# Patient Record
Sex: FEMALE | Race: OTHER | HISPANIC OR LATINO
[De-identification: names, ages, dates, MRNs, and addresses within clinical notes are randomized per-mention and may not be internally consistent; named-entity substitution may affect disease eponyms.]

---

## 2018-06-04 ENCOUNTER — APPOINTMENT (OUTPATIENT)
Dept: GYNECOLOGIC ONCOLOGY | Facility: CLINIC | Age: 38
End: 2018-06-04
Payer: MEDICAID

## 2018-06-04 VITALS
WEIGHT: 196 LBS | HEART RATE: 61 BPM | DIASTOLIC BLOOD PRESSURE: 81 MMHG | OXYGEN SATURATION: 100 % | SYSTOLIC BLOOD PRESSURE: 119 MMHG

## 2018-06-04 PROCEDURE — 99203 OFFICE O/P NEW LOW 30 MIN: CPT | Mod: 25

## 2018-06-04 PROCEDURE — 57454 BX/CURETT OF CERVIX W/SCOPE: CPT

## 2018-06-04 RX ORDER — IRON/IRON ASP GLY/FA/MV-MIN 38 125-25-1MG
TABLET ORAL
Refills: 0 | Status: ACTIVE | COMMUNITY

## 2018-06-08 ENCOUNTER — RESULT REVIEW (OUTPATIENT)
Age: 38
End: 2018-06-08

## 2018-06-08 LAB — LH SURGICAL PATHOLOGY FINAL REPORT: NORMAL

## 2018-07-09 ENCOUNTER — APPOINTMENT (OUTPATIENT)
Dept: GYNECOLOGIC ONCOLOGY | Facility: CLINIC | Age: 38
End: 2018-07-09
Payer: MEDICAID

## 2018-07-09 VITALS
HEART RATE: 88 BPM | WEIGHT: 195.38 LBS | OXYGEN SATURATION: 97 % | DIASTOLIC BLOOD PRESSURE: 82 MMHG | SYSTOLIC BLOOD PRESSURE: 115 MMHG

## 2018-07-09 PROCEDURE — 36415 COLL VENOUS BLD VENIPUNCTURE: CPT

## 2018-07-09 PROCEDURE — 99214 OFFICE O/P EST MOD 30 MIN: CPT

## 2018-07-10 LAB
APTT BLD: 28 SEC
BASOPHILS # BLD AUTO: 0.05 K/UL
BASOPHILS NFR BLD AUTO: 0.8 %
EOSINOPHIL # BLD AUTO: 0.1 K/UL
EOSINOPHIL NFR BLD AUTO: 1.6 %
ESTIMATED AVERAGE GLUCOSE: 128 MG/DL
HBA1C MFR BLD HPLC: 6.1 %
HCT VFR BLD CALC: 35.4 %
HGB BLD-MCNC: 10.7 G/DL
IMM GRANULOCYTES NFR BLD AUTO: 0.2 %
INR PPP: 1.08 RATIO
LYMPHOCYTES # BLD AUTO: 2.6 K/UL
LYMPHOCYTES NFR BLD AUTO: 40.4 %
MAN DIFF?: NORMAL
MCHC RBC-ENTMCNC: 23.4 PG
MCHC RBC-ENTMCNC: 30.2 GM/DL
MCV RBC AUTO: 77.3 FL
MONOCYTES # BLD AUTO: 0.44 K/UL
MONOCYTES NFR BLD AUTO: 6.8 %
NEUTROPHILS # BLD AUTO: 3.23 K/UL
NEUTROPHILS NFR BLD AUTO: 50.2 %
PLATELET # BLD AUTO: 415 K/UL
PT BLD: 12.2 SEC
RBC # BLD: 4.58 M/UL
RBC # FLD: 14.9 %
WBC # FLD AUTO: 6.43 K/UL

## 2018-07-11 LAB
ALBUMIN SERPL ELPH-MCNC: 4.5 G/DL
ALP BLD-CCNC: 70 U/L
ALT SERPL-CCNC: 14 U/L
ANION GAP SERPL CALC-SCNC: 12 MMOL/L
AST SERPL-CCNC: 21 U/L
BILIRUB SERPL-MCNC: 0.3 MG/DL
BUN SERPL-MCNC: 10 MG/DL
CALCIUM SERPL-MCNC: 9.7 MG/DL
CHLORIDE SERPL-SCNC: 103 MMOL/L
CO2 SERPL-SCNC: 24 MMOL/L
CREAT SERPL-MCNC: 0.89 MG/DL
POTASSIUM SERPL-SCNC: 4.3 MMOL/L
PROT SERPL-MCNC: 7.7 G/DL
SODIUM SERPL-SCNC: 139 MMOL/L

## 2018-07-19 ENCOUNTER — RESULT REVIEW (OUTPATIENT)
Age: 38
End: 2018-07-19

## 2018-07-19 ENCOUNTER — OUTPATIENT (OUTPATIENT)
Dept: OUTPATIENT SERVICES | Facility: HOSPITAL | Age: 38
LOS: 1 days | Discharge: HOME | End: 2018-07-19
Payer: COMMERCIAL

## 2018-07-19 ENCOUNTER — APPOINTMENT (OUTPATIENT)
Dept: GYNECOLOGIC ONCOLOGY | Facility: HOSPITAL | Age: 38
End: 2018-07-19

## 2018-07-19 VITALS
HEIGHT: 66 IN | TEMPERATURE: 97 F | OXYGEN SATURATION: 100 % | SYSTOLIC BLOOD PRESSURE: 110 MMHG | WEIGHT: 193.12 LBS | DIASTOLIC BLOOD PRESSURE: 78 MMHG | HEART RATE: 88 BPM | RESPIRATION RATE: 16 BRPM

## 2018-07-19 PROCEDURE — 58552 LAPARO-VAG HYST INCL T/O: CPT

## 2018-07-19 RX ORDER — ACETAMINOPHEN 500 MG
1000 TABLET ORAL ONCE
Qty: 0 | Refills: 0 | Status: COMPLETED | OUTPATIENT
Start: 2018-07-19 | End: 2018-07-19

## 2018-07-19 RX ORDER — OXYCODONE HYDROCHLORIDE 5 MG/1
5 TABLET ORAL EVERY 4 HOURS
Qty: 0 | Refills: 0 | Status: DISCONTINUED | OUTPATIENT
Start: 2018-07-19 | End: 2018-07-20

## 2018-07-19 RX ORDER — HEPARIN SODIUM 5000 [USP'U]/ML
5000 INJECTION INTRAVENOUS; SUBCUTANEOUS ONCE
Qty: 0 | Refills: 0 | Status: COMPLETED | OUTPATIENT
Start: 2018-07-19 | End: 2018-07-19

## 2018-07-19 RX ORDER — CELECOXIB 200 MG/1
400 CAPSULE ORAL ONCE
Qty: 0 | Refills: 0 | Status: COMPLETED | OUTPATIENT
Start: 2018-07-19 | End: 2018-07-19

## 2018-07-19 RX ORDER — HYDROMORPHONE HYDROCHLORIDE 2 MG/ML
0.2 INJECTION INTRAMUSCULAR; INTRAVENOUS; SUBCUTANEOUS
Qty: 0 | Refills: 0 | Status: DISCONTINUED | OUTPATIENT
Start: 2018-07-19 | End: 2018-07-20

## 2018-07-19 RX ORDER — SODIUM CHLORIDE 9 MG/ML
3 INJECTION INTRAMUSCULAR; INTRAVENOUS; SUBCUTANEOUS EVERY 8 HOURS
Qty: 0 | Refills: 0 | Status: DISCONTINUED | OUTPATIENT
Start: 2018-07-19 | End: 2018-07-20

## 2018-07-19 RX ORDER — KETOROLAC TROMETHAMINE 30 MG/ML
30 SYRINGE (ML) INJECTION EVERY 6 HOURS
Qty: 0 | Refills: 0 | Status: DISCONTINUED | OUTPATIENT
Start: 2018-07-19 | End: 2018-07-20

## 2018-07-19 RX ORDER — OXYCODONE HYDROCHLORIDE 5 MG/1
10 TABLET ORAL EVERY 4 HOURS
Qty: 0 | Refills: 0 | Status: DISCONTINUED | OUTPATIENT
Start: 2018-07-19 | End: 2018-07-20

## 2018-07-19 RX ORDER — GABAPENTIN 400 MG/1
600 CAPSULE ORAL ONCE
Qty: 0 | Refills: 0 | Status: COMPLETED | OUTPATIENT
Start: 2018-07-19 | End: 2018-07-19

## 2018-07-19 RX ORDER — ONDANSETRON 8 MG/1
4 TABLET, FILM COATED ORAL ONCE
Qty: 0 | Refills: 0 | Status: DISCONTINUED | OUTPATIENT
Start: 2018-07-19 | End: 2018-07-20

## 2018-07-19 RX ADMIN — Medication 30 MILLIGRAM(S): at 22:56

## 2018-07-19 RX ADMIN — GABAPENTIN 600 MILLIGRAM(S): 400 CAPSULE ORAL at 14:33

## 2018-07-19 RX ADMIN — Medication 400 MILLIGRAM(S): at 20:48

## 2018-07-19 RX ADMIN — CELECOXIB 400 MILLIGRAM(S): 200 CAPSULE ORAL at 14:30

## 2018-07-19 RX ADMIN — SODIUM CHLORIDE 3 MILLILITER(S): 9 INJECTION INTRAMUSCULAR; INTRAVENOUS; SUBCUTANEOUS at 22:07

## 2018-07-19 RX ADMIN — HEPARIN SODIUM 5000 UNIT(S): 5000 INJECTION INTRAVENOUS; SUBCUTANEOUS at 14:31

## 2018-07-19 RX ADMIN — Medication 30 MILLIGRAM(S): at 23:55

## 2018-07-19 RX ADMIN — Medication 1000 MILLIGRAM(S): at 14:31

## 2018-07-19 RX ADMIN — Medication 1000 MILLIGRAM(S): at 21:20

## 2018-07-19 NOTE — BRIEF OPERATIVE NOTE - PROCEDURE
<<-----Click on this checkbox to enter Procedure Robot-assisted hysterectomy with salpingectomy  07/19/2018  partial salpingectomy  Active  ALITTLERIC

## 2018-07-19 NOTE — BRIEF OPERATIVE NOTE - POST-OP DX
Cervical dysplasia  07/19/2018    Active  Samantha De La O  Multiparity  07/19/2018    Active  Samantha De La O

## 2018-07-19 NOTE — PROGRESS NOTE ADULT - SUBJECTIVE AND OBJECTIVE BOX
GYN POC   Patient seen at bedside at 1040pm.   Pain controlled with IV tylenol, at this time requesting toradol as pain recently increased to about 7/10. Tolerating sips and crackers.  No OOB yet.  S/p arauz, attempting to walk with nurses now to attempt to use bathroom.   Denies CP, palpitations, SOB, fever, chills, nausea, vomiting.    Vital Signs Last 24 Hrs  T(C): 36.9 (19 Jul 2018 21:48), Max: 36.9 (19 Jul 2018 19:40)  T(F): 98.4 (19 Jul 2018 21:48), Max: 98.5 (19 Jul 2018 19:40)  HR: 108 (19 Jul 2018 21:48) (88 - 108)  BP: 116/75 (19 Jul 2018 21:48) (110/78 - 128/72)  BP(mean): 90 (19 Jul 2018 21:48) (89 - 98)  RR: 14 (19 Jul 2018 21:48) (5 - 16)  SpO2: 100% (19 Jul 2018 21:48) (92% - 100%)    Physical Exam:  Gen: No Acute Distress  Pulm: Clear to auscultation bilaterally  GI: soft, appropriately tender, mildly distended, decreased BS, no rebound, no guarding, laproscopic sites are dry/clean/intact  Ext: [facility] in place, [facility]    I&O's Summary    19 Jul 2018 07:01  -  19 Jul 2018 22:48  --------------------------------------------------------  IN: 350 mL / OUT: 0 mL / NET: 350 mL      MEDICATIONS  (STANDING):  ketorolac   Injectable 30 milliGRAM(s) IV Push every 6 hours  sodium chloride 0.9% lock flush 3 milliLiter(s) IV Push every 8 hours    MEDICATIONS  (PRN):  HYDROmorphone  Injectable 0.2 milliGRAM(s) IV Push every 2 hours PRN breakthrough pain  oxyCODONE    IR 5 milliGRAM(s) Oral every 4 hours PRN Moderate Pain (4 - 6)  oxyCODONE    IR 10 milliGRAM(s) Oral every 4 hours PRN Severe Pain (7 - 10)    Allergies    No Known Drug Allergies  shellfish (Flushing (Skin))    Intolerances        LABS: GYN POC   Patient seen at bedside at 1040pm.   Pain was controlled with IV tylenol, but reports that recently her pain has increased to about 7/10 and now requesting toradol. States she has a dull ache in her abdomen that was initially relieved with the tylenol. Has not had any other medications since procedure.  Tolerating sips and crackers.  No OOB yet.  S/p arauz, attempting to walk with nurses now to attempt to use bathroom.   Denies CP, palpitations, SOB, fever, chills, nausea, vomiting.    Vital Signs Last 24 Hrs  T(C): 36.9 (19 Jul 2018 21:48), Max: 36.9 (19 Jul 2018 19:40)  T(F): 98.4 (19 Jul 2018 21:48), Max: 98.5 (19 Jul 2018 19:40)  HR: 108 (19 Jul 2018 21:48) (88 - 108)  BP: 116/75 (19 Jul 2018 21:48) (110/78 - 128/72)  BP(mean): 90 (19 Jul 2018 21:48) (89 - 98)  RR: 14 (19 Jul 2018 21:48) (5 - 16)  SpO2: 100% (19 Jul 2018 21:48) (92% - 100%)    Physical Exam:  Gen: No Acute Distress  Pulm: Clear to auscultation bilaterally  GI: soft, appropriately tender, non distended, +BS, no rebound, no guarding, laparoscopic sites are dry/clean/intact  Ext: SCDs in place, wnl    I&O's Summary    19 Jul 2018 07:01  -  19 Jul 2018 22:48  --------------------------------------------------------  IN: 350 mL / OUT: 0 mL / NET: 350 mL      MEDICATIONS  (STANDING):  ketorolac   Injectable 30 milliGRAM(s) IV Push every 6 hours  sodium chloride 0.9% lock flush 3 milliLiter(s) IV Push every 8 hours    MEDICATIONS  (PRN):  HYDROmorphone  Injectable 0.2 milliGRAM(s) IV Push every 2 hours PRN breakthrough pain  oxyCODONE    IR 5 milliGRAM(s) Oral every 4 hours PRN Moderate Pain (4 - 6)  oxyCODONE    IR 10 milliGRAM(s) Oral every 4 hours PRN Severe Pain (7 - 10)    Allergies    No Known Drug Allergies  shellfish (Flushing (Skin))    Intolerances        LABS:

## 2018-07-19 NOTE — PROGRESS NOTE ADULT - ASSESSMENT
37y Female POD# 0 s/p  RA total hysterectomy with  partial bilateral salpingectomy                                     1. Neuro/Pain:  Acetaminophen 1000mg IV q8 ATC, Toradol 30mg IV q8 ATC, Oxycodone 5mg or 10mg PRN, Hydromoprhone 0.2mg IVP for breakthrough  2  CV:   VS per routine  3. Pulm: Encourage ISS  4. GI: Low Residue Diet   5. :  s/p arauz, f/u TOV   6. Heme: -  7. ID: --  8. DVT ppx: SCDs, ambulation encouraged   9. Dispo: pending ability to urinate, d/c instructions discussed and follow up instructions discussed

## 2018-07-20 VITALS
RESPIRATION RATE: 18 BRPM | OXYGEN SATURATION: 97 % | HEART RATE: 106 BPM | SYSTOLIC BLOOD PRESSURE: 106 MMHG | DIASTOLIC BLOOD PRESSURE: 68 MMHG

## 2018-07-20 PROCEDURE — 86900 BLOOD TYPING SEROLOGIC ABO: CPT

## 2018-07-20 PROCEDURE — 88341 IMHCHEM/IMCYTCHM EA ADD ANTB: CPT

## 2018-07-20 PROCEDURE — 88360 TUMOR IMMUNOHISTOCHEM/MANUAL: CPT

## 2018-07-20 PROCEDURE — 86850 RBC ANTIBODY SCREEN: CPT

## 2018-07-20 PROCEDURE — 88305 TISSUE EXAM BY PATHOLOGIST: CPT

## 2018-07-20 PROCEDURE — 58552 LAPARO-VAG HYST INCL T/O: CPT

## 2018-07-20 PROCEDURE — 86901 BLOOD TYPING SEROLOGIC RH(D): CPT

## 2018-07-20 PROCEDURE — S2900: CPT

## 2018-07-25 LAB — SURGICAL PATHOLOGY STUDY: SIGNIFICANT CHANGE UP

## 2018-07-26 ENCOUNTER — APPOINTMENT (OUTPATIENT)
Dept: GYNECOLOGIC ONCOLOGY | Facility: CLINIC | Age: 38
End: 2018-07-26
Payer: MEDICAID

## 2018-07-26 VITALS
SYSTOLIC BLOOD PRESSURE: 102 MMHG | WEIGHT: 193.5 LBS | HEART RATE: 101 BPM | DIASTOLIC BLOOD PRESSURE: 70 MMHG | OXYGEN SATURATION: 100 %

## 2018-07-26 DIAGNOSIS — D25.1 INTRAMURAL LEIOMYOMA OF UTERUS: ICD-10-CM

## 2018-07-26 PROCEDURE — 99024 POSTOP FOLLOW-UP VISIT: CPT

## 2018-07-31 PROBLEM — D25.1 INTRAMURAL LEIOMYOMA OF UTERUS: Status: ACTIVE | Noted: 2018-07-09

## 2018-07-31 LAB — SURGICAL PATHOLOGY STUDY: SIGNIFICANT CHANGE UP

## 2018-08-08 LAB — SURGICAL PATHOLOGY STUDY: SIGNIFICANT CHANGE UP

## 2018-08-16 ENCOUNTER — APPOINTMENT (OUTPATIENT)
Dept: GYNECOLOGIC ONCOLOGY | Facility: CLINIC | Age: 38
End: 2018-08-16
Payer: MEDICAID

## 2018-08-16 VITALS
WEIGHT: 197.5 LBS | DIASTOLIC BLOOD PRESSURE: 77 MMHG | HEART RATE: 86 BPM | SYSTOLIC BLOOD PRESSURE: 112 MMHG | OXYGEN SATURATION: 96 %

## 2018-08-16 DIAGNOSIS — N92.6 IRREGULAR MENSTRUATION, UNSPECIFIED: ICD-10-CM

## 2018-08-16 DIAGNOSIS — N92.0 EXCESSIVE AND FREQUENT MENSTRUATION WITH REGULAR CYCLE: ICD-10-CM

## 2018-08-16 DIAGNOSIS — D25.9 LEIOMYOMA OF UTERUS, UNSPECIFIED: ICD-10-CM

## 2018-08-16 PROCEDURE — 99024 POSTOP FOLLOW-UP VISIT: CPT

## 2018-08-17 PROBLEM — N92.6 ABNORMAL MENSES: Status: ACTIVE | Noted: 2018-06-04

## 2018-08-17 PROBLEM — D25.9 FIBROIDS: Status: ACTIVE | Noted: 2018-07-09

## 2018-08-17 PROBLEM — N92.0 MENORRHAGIA WITH REGULAR CYCLE: Status: ACTIVE | Noted: 2018-07-09

## 2019-07-19 ENCOUNTER — APPOINTMENT (OUTPATIENT)
Dept: GYNECOLOGIC ONCOLOGY | Facility: CLINIC | Age: 39
End: 2019-07-19

## 2019-10-17 ENCOUNTER — APPOINTMENT (OUTPATIENT)
Dept: GYNECOLOGIC ONCOLOGY | Facility: CLINIC | Age: 39
End: 2019-10-17
Payer: MEDICAID

## 2019-10-24 ENCOUNTER — APPOINTMENT (OUTPATIENT)
Dept: GYNECOLOGIC ONCOLOGY | Facility: CLINIC | Age: 39
End: 2019-10-24
Payer: MEDICAID

## 2019-10-24 VITALS
HEIGHT: 66 IN | SYSTOLIC BLOOD PRESSURE: 100 MMHG | BODY MASS INDEX: 32.62 KG/M2 | WEIGHT: 203 LBS | DIASTOLIC BLOOD PRESSURE: 78 MMHG

## 2019-10-24 DIAGNOSIS — Z01.419 ENCOUNTER FOR GYNECOLOGICAL EXAMINATION (GENERAL) (ROUTINE) W/OUT ABNORMAL FINDINGS: ICD-10-CM

## 2019-10-24 PROCEDURE — 99395 PREV VISIT EST AGE 18-39: CPT

## 2019-10-24 RX ORDER — OXYCODONE AND ACETAMINOPHEN 5; 325 MG/1; MG/1
5-325 TABLET ORAL
Qty: 15 | Refills: 0 | Status: COMPLETED | COMMUNITY
Start: 2018-07-18 | End: 2019-10-24

## 2019-10-24 NOTE — PHYSICAL EXAM
[Normal] : Breasts: Normal [de-identified] : Normal symmetry. No palpable mass. No lymphadenopathy.  [de-identified] : well healed low transverse incision, healing lapsky incisions  [de-identified] : well healed vaginal cuff

## 2019-10-24 NOTE — ASSESSMENT
[FreeTextEntry1] : 38 y/o s/p TLH, BL Salpingectomy with normal GYN exam today. Pt reassured that her GYN exam was unremarkable. Pt with symptoms of depression. However, pt denies depression and suicidal ideation. Pt offered referral to psychologist- pt declined.. Pt offered antidepressants and states she is hesitant but will think about it and let me know. \par \par [] Follow up in 1 year\par [] Pt advised if she wants to talk to me further about her depression symptoms or if she needs antidepressants/psychology referral she can call the office at any time.

## 2019-10-24 NOTE — PAST MEDICAL HISTORY
[FreeTextEntry3] : prolonged [FreeTextEntry5] :  x4,  Section x1 in   children are from 21-8 years old.   She does not want any further children.

## 2019-10-24 NOTE — HISTORY OF PRESENT ILLNESS
[FreeTextEntry1] : Problem\par 1)HSIL pap\par 2) Prolonged Menses\par \par Previous Therapy\par 1)Pap 8/22/2017\par    a)ASCUS, HPV/HR +, BV+\par 2)Pap 1/08/2018\par    a)ASCUS, HPV/HR +\par 3) Pap 5/10/18\par    a) HSIL, HPV+ non 16/18\par 4)Colposcopy 6/08/2018\par    a)ANNA 2, HGSIL\par 5)US 6/26/2018\par    a)Uterus 11x4.7x6.5cm\par    b)Endometrium 17mm\par 6)Advanced laparoscopic robotic assisted total hysterectomy bilateral salpingectomy 7/19/2018\par    a)Benign pathology\par \par Patient presents today for annual gyn exam. Pt report for the past 6 months she has been feeling sad lately w/ noticeable decrease in wanting to go out and do her hair and nails. Pt reports she has been preoccupied with the idea that maybe something is wrong with her regarding her pelvis. Pt denies any abdominal pain.

## 2019-11-14 ENCOUNTER — APPOINTMENT (OUTPATIENT)
Dept: GYNECOLOGIC ONCOLOGY | Facility: CLINIC | Age: 39
End: 2019-11-14
Payer: MEDICAID

## 2019-12-06 ENCOUNTER — APPOINTMENT (OUTPATIENT)
Dept: GYNECOLOGIC ONCOLOGY | Facility: CLINIC | Age: 39
End: 2019-12-06
Payer: MEDICAID

## 2019-12-06 VITALS
OXYGEN SATURATION: 98 % | HEIGHT: 66 IN | DIASTOLIC BLOOD PRESSURE: 80 MMHG | SYSTOLIC BLOOD PRESSURE: 120 MMHG | HEART RATE: 88 BPM | WEIGHT: 200 LBS | BODY MASS INDEX: 32.14 KG/M2

## 2019-12-06 DIAGNOSIS — N76.0 ACUTE VAGINITIS: ICD-10-CM

## 2019-12-06 DIAGNOSIS — B96.89 ACUTE VAGINITIS: ICD-10-CM

## 2019-12-06 DIAGNOSIS — R87.810 ATYPICAL SQUAMOUS CELLS OF UNDETERMINED SIGNIFICANCE ON CYTOLOGIC SMEAR OF CERVIX (ASC-US): ICD-10-CM

## 2019-12-06 DIAGNOSIS — R87.610 ATYPICAL SQUAMOUS CELLS OF UNDETERMINED SIGNIFICANCE ON CYTOLOGIC SMEAR OF CERVIX (ASC-US): ICD-10-CM

## 2019-12-06 PROCEDURE — 99213 OFFICE O/P EST LOW 20 MIN: CPT

## 2019-12-06 NOTE — REVIEW OF SYSTEMS
[Vaginal Discharge] : vaginal discharge [Hematuria] : no hematuria [Abn Vag Bleeding] : no abnormal vaginal bleeding [Dysuria] : no dysuria [de-identified] : +abdominal pain described and cramps and burning sensation  [FreeTextEntry3] : increased sadness, states she feels depressed at times but not every day.

## 2019-12-06 NOTE — ASSESSMENT
[FreeTextEntry1] : []Pelvic Pain\par - Exam unremarkable only for vaginal discharge c/w BV - will prescribe metronidazole x 5 nights\par -UA to r/o UTI \par -Will order Pelvic sonogram to further evaluate\par \par [] Vaginal pap/HPV done today\par [] Pt advised if she wants to talk to me further about her depression symptoms or if she needs antidepressants/psychology referral she can call the office at any time. \par [] Will call patient with results.

## 2019-12-06 NOTE — HISTORY OF PRESENT ILLNESS
[FreeTextEntry1] : Problem\par 1)HSIL pap\par 2) Prolonged Menses\par \par Previous Therapy\par 1)Pap 8/22/2017\par    a)ASCUS, HPV/HR +, BV+\par 2)Pap 1/08/2018\par    a)ASCUS, HPV/HR +\par 3) Pap 5/10/18\par    a) HSIL, HPV+ non 16/18\par 4)Colposcopy 6/08/2018\par    a)ANNA 2, HGSIL\par 5)US 6/26/2018\par    a)Uterus 11x4.7x6.5cm\par    b)Endometrium 17mm\par 6)Advanced laparoscopic robotic assisted total hysterectomy bilateral salpingectomy 7/19/2018\par    a)Benign pathology\par \par Here for follow up, pap not done during her annual exam last month. Pt also presents c/o pelvic pain described as cramping and burning x 1 month. Pt reports pelvic pain is intermittent and not associated w/ urinary or bowel symptoms.

## 2019-12-06 NOTE — PHYSICAL EXAM
[Abnormal] : Examination of vagina: Abnormal [de-identified] : No palpable mass, Nontender  [de-identified] : Normal symmetry. No palpable mass. No lymphadenopathy.  [de-identified] : Moderate amount of White frothy discharge c/w Bacterial vaginosis. Otherwise no lesions and no active bleeding. Vaginal cuff normal.  [de-identified] : well healed low transverse incision, healing lapsky incisions  [de-identified] : No palpable mass. Non tender

## 2019-12-09 LAB
APPEARANCE: CLEAR
BILIRUBIN URINE: NEGATIVE
BLOOD URINE: NEGATIVE
COLOR: YELLOW
GLUCOSE QUALITATIVE U: NEGATIVE
HPV HIGH+LOW RISK DNA PNL CVX: NOT DETECTED
KETONES URINE: NEGATIVE
LEUKOCYTE ESTERASE URINE: NEGATIVE
NITRITE URINE: NEGATIVE
PH URINE: 5.5
PROTEIN URINE: NORMAL
SPECIFIC GRAVITY URINE: 1.03
UROBILINOGEN URINE: NORMAL

## 2019-12-11 ENCOUNTER — OUTPATIENT (OUTPATIENT)
Dept: OUTPATIENT SERVICES | Facility: HOSPITAL | Age: 39
LOS: 1 days | End: 2019-12-11
Payer: COMMERCIAL

## 2019-12-11 ENCOUNTER — APPOINTMENT (OUTPATIENT)
Dept: ULTRASOUND IMAGING | Facility: HOSPITAL | Age: 39
End: 2019-12-11
Payer: MEDICAID

## 2019-12-11 PROCEDURE — 76856 US EXAM PELVIC COMPLETE: CPT | Mod: 26

## 2019-12-11 PROCEDURE — 76830 TRANSVAGINAL US NON-OB: CPT | Mod: 26

## 2019-12-11 PROCEDURE — 76856 US EXAM PELVIC COMPLETE: CPT

## 2019-12-11 PROCEDURE — 76830 TRANSVAGINAL US NON-OB: CPT

## 2020-07-14 ENCOUNTER — APPOINTMENT (OUTPATIENT)
Dept: GYNECOLOGIC ONCOLOGY | Facility: CLINIC | Age: 40
End: 2020-07-14

## 2020-10-29 ENCOUNTER — APPOINTMENT (OUTPATIENT)
Dept: GYNECOLOGIC ONCOLOGY | Facility: CLINIC | Age: 40
End: 2020-10-29

## 2020-12-11 ENCOUNTER — APPOINTMENT (OUTPATIENT)
Dept: GYNECOLOGIC ONCOLOGY | Facility: CLINIC | Age: 40
End: 2020-12-11

## 2020-12-18 ENCOUNTER — NON-APPOINTMENT (OUTPATIENT)
Age: 40
End: 2020-12-18

## 2020-12-21 PROBLEM — Z01.419 ENCOUNTER FOR ROUTINE GYNECOLOGICAL EXAMINATION: Status: RESOLVED | Noted: 2019-10-24 | Resolved: 2020-12-21

## 2020-12-21 PROBLEM — N76.0 BACTERIAL VAGINOSIS: Status: RESOLVED | Noted: 2019-12-06 | Resolved: 2020-12-21

## 2021-10-14 ENCOUNTER — APPOINTMENT (OUTPATIENT)
Dept: GYNECOLOGIC ONCOLOGY | Facility: CLINIC | Age: 41
End: 2021-10-14

## 2021-11-03 ENCOUNTER — APPOINTMENT (OUTPATIENT)
Dept: GYNECOLOGIC ONCOLOGY | Facility: CLINIC | Age: 41
End: 2021-11-03

## 2023-01-17 ENCOUNTER — APPOINTMENT (OUTPATIENT)
Dept: GYNECOLOGIC ONCOLOGY | Facility: CLINIC | Age: 43
End: 2023-01-17
Payer: MEDICAID

## 2023-01-17 VITALS
TEMPERATURE: 97.3 F | SYSTOLIC BLOOD PRESSURE: 132 MMHG | BODY MASS INDEX: 33.59 KG/M2 | DIASTOLIC BLOOD PRESSURE: 89 MMHG | WEIGHT: 209 LBS | HEIGHT: 66 IN | HEART RATE: 99 BPM | OXYGEN SATURATION: 99 %

## 2023-01-17 DIAGNOSIS — Z00.00 ENCOUNTER FOR GENERAL ADULT MEDICAL EXAMINATION W/OUT ABNORMAL FINDINGS: ICD-10-CM

## 2023-01-17 DIAGNOSIS — R10.2 PELVIC AND PERINEAL PAIN: ICD-10-CM

## 2023-01-17 DIAGNOSIS — R87.613 HIGH GRADE SQUAMOUS INTRAEPITHELIAL LESION ON CYTOLOGIC SMEAR OF CERVIX (HGSIL): ICD-10-CM

## 2023-01-17 PROCEDURE — 99396 PREV VISIT EST AGE 40-64: CPT

## 2023-01-17 NOTE — DISCUSSION/SUMMARY
[FreeTextEntry1] : Annual exam without abnormal findings\par Pelvic Pain\par -likely post operative but patient with retained ovaries\par -Will order Pelvic sonogram to further evaluate\par \par [] Vaginal pap/HPV done today\par []mammo/breast sosno rx given \par [] Will call patient with results.

## 2023-01-17 NOTE — HISTORY OF PRESENT ILLNESS
[FreeTextEntry1] : Problem\par 1)HSIL pap\par 2) Prolonged Menses\par \par Previous Therapy\par 1)Pap 8/22/2017\par    a)ASCUS, HPV/HR +, BV+\par 2)Pap 1/08/2018\par    a)ASCUS, HPV/HR +\par 3) Pap 5/10/18\par    a) HSIL, HPV+ non 16/18\par 4)Colposcopy 6/08/2018\par    a)ANNA 2, HGSIL\par 5)US 6/26/2018\par    a)Uterus 11x4.7x6.5cm\par    b)Endometrium 17mm\par 6)Advanced laparoscopic robotic assisted total hysterectomy bilateral salpingectomy 7/19/2018\par    a)Benign pathology\par 7) Pap 12/2019 neg, HPV neg\par \par Here for annual exam. Having intermittent RLQ discomfort - feels hot. Not associated with any other symptoms. No bloating. Feels dry with intercourse but no pain.

## 2023-01-17 NOTE — REVIEW OF SYSTEMS
[Negative] : Musculoskeletal [Hematuria] : no hematuria [Dysuria] : no dysuria [Vaginal Discharge] : vaginal discharge [Abn Vag Bleeding] : no abnormal vaginal bleeding [FreeTextEntry3] : increased sadness, states she feels depressed at times but not every day. [de-identified] : +abdominal pain described and cramps and burning sensation

## 2023-01-17 NOTE — PAST MEDICAL HISTORY
[Menstruating] : The patient is menstruating [Menarche Age ____] : age at menarche was [unfilled] [Definite ___ (Date)] : the last menstrual period was [unfilled] [Excessive Bleeding] : there was excessive bleeding [Abnormal Duration ___ days] : the duration was abnormal lasting [unfilled] days [Tubal Ligation] : has had a tubal ligation [Total Preg ___] : G[unfilled] [Live Births ___] : P[unfilled]  [Abortions ___] : Abortions:[unfilled] [Living ___] : Living: [unfilled] [AB Spont ___] : miscarriages: [unfilled]  [FreeTextEntry3] : prolonged [FreeTextEntry5] :  x4,  Section x1 in   children are from 21-8 years old.   She does not want any further children.

## 2023-01-17 NOTE — PHYSICAL EXAM
[Chaperone Present] : A chaperone was present in the examining room during all aspects of the physical examination [Absent] : Uterus: Absent [Normal] : Bimanual Exam: Normal [de-identified] : well healed low transverse incision, healing lapsky incisions  [de-identified] : No palpable mass, Nontender  [de-identified] : Normal symmetry. No palpable mass. No lymphadenopathy.  [de-identified] : No palpable mass. Non tender [Fully active, able to carry on all pre-disease performance without restriction] : Status 0 - Fully active, able to carry on all pre-disease performance without restriction

## 2023-01-20 LAB
CYTOLOGY CVX/VAG DOC THIN PREP: ABNORMAL
HPV HIGH+LOW RISK DNA PNL CVX: NOT DETECTED

## 2023-01-20 RX ORDER — DOCUSATE SODIUM 100 MG/1
100 CAPSULE ORAL TWICE DAILY
Qty: 60 | Refills: 0 | Status: COMPLETED | COMMUNITY
Start: 2018-07-18 | End: 2023-01-20

## 2023-01-20 RX ORDER — IBUPROFEN 800 MG/1
800 TABLET, FILM COATED ORAL 3 TIMES DAILY
Qty: 30 | Refills: 0 | Status: COMPLETED | COMMUNITY
Start: 2018-07-18 | End: 2023-01-20

## 2023-01-20 RX ORDER — METRONIDAZOLE 7.5 MG/G
0.75 GEL VAGINAL
Qty: 1 | Refills: 0 | Status: ACTIVE | COMMUNITY
Start: 2019-12-06 | End: 1900-01-01

## 2023-01-31 NOTE — PRE-OP CHECKLIST - PATIENT SENT TO
operating room Topical Sulfur Applications Counseling: Topical Sulfur Counseling: Patient counseled that this medication may cause skin irritation or allergic reactions.  In the event of skin irritation, the patient was advised to reduce the amount of the drug applied or use it less frequently.   The patient verbalized understanding of the proper use and possible adverse effects of topical sulfur application.  All of the patient's questions and concerns were addressed.